# Patient Record
Sex: MALE | Race: WHITE | URBAN - METROPOLITAN AREA
[De-identification: names, ages, dates, MRNs, and addresses within clinical notes are randomized per-mention and may not be internally consistent; named-entity substitution may affect disease eponyms.]

---

## 2019-04-02 ENCOUNTER — OFFICE VISIT (OUTPATIENT)
Dept: ORTHOPEDIC SURGERY | Age: 59
End: 2019-04-02
Payer: COMMERCIAL

## 2019-04-02 VITALS
WEIGHT: 285 LBS | HEART RATE: 68 BPM | BODY MASS INDEX: 40.8 KG/M2 | RESPIRATION RATE: 16 BRPM | DIASTOLIC BLOOD PRESSURE: 87 MMHG | HEIGHT: 70 IN | SYSTOLIC BLOOD PRESSURE: 120 MMHG

## 2019-04-02 DIAGNOSIS — S93.491A HIGH ANKLE SPRAIN OF RIGHT LOWER EXTREMITY, INITIAL ENCOUNTER: ICD-10-CM

## 2019-04-02 DIAGNOSIS — M25.571 ACUTE RIGHT ANKLE PAIN: ICD-10-CM

## 2019-04-02 DIAGNOSIS — S82.391D CLOSED FRACTURE OF POSTERIOR MALLEOLUS OF RIGHT TIBIA WITH ROUTINE HEALING, SUBSEQUENT ENCOUNTER: Primary | ICD-10-CM

## 2019-04-02 PROCEDURE — G8427 DOCREV CUR MEDS BY ELIG CLIN: HCPCS | Performed by: ORTHOPAEDIC SURGERY

## 2019-04-02 PROCEDURE — 1036F TOBACCO NON-USER: CPT | Performed by: ORTHOPAEDIC SURGERY

## 2019-04-02 PROCEDURE — G8417 CALC BMI ABV UP PARAM F/U: HCPCS | Performed by: ORTHOPAEDIC SURGERY

## 2019-04-02 PROCEDURE — 3017F COLORECTAL CA SCREEN DOC REV: CPT | Performed by: ORTHOPAEDIC SURGERY

## 2019-04-02 PROCEDURE — 99203 OFFICE O/P NEW LOW 30 MIN: CPT | Performed by: ORTHOPAEDIC SURGERY

## 2019-04-02 NOTE — PROGRESS NOTES
CHIEF COMPLAINT: Right ankle pain / posterior malleolus fracture and high ankle sprain. DATE OF INJURY: 1/20/2019    HISTORY:  Mr. Lex Ramirez 62 y.o.  male presents today for second opinion evaluation of a right ankle injury which occurred when he was walking and slipped on the ice on 1/20/2019. He was first seen and evaluated in [facility] ER , where he was x-rayed, splinted and then saw orthopedist, Ismael Black and was treated in a boot weightbearing. 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 He had increased swelling and pain was intermittent. He has been wearing the boot on and off since then. He was not happy with this first orthopedic surgeon so he is here for a second opinion. He is complaining of medial and posterior ankle pain and swelling. This is better with elevation and worse with bearing any wt. The pain is achy and sore and not radiating. No numbness or tingling sensation. Alleviating factors: rest. He is unable to take NSAIDs due to allergy. No other complaint. His job requires average of 60 hours per week working and most affected is walking.     Past Medical History:   Diagnosis Date    Asthma     Diverticulitis     Hyperlipidemia     Hypertension        Past Surgical History:   Procedure Laterality Date    COLECTOMY      COLONOSCOPY      polypectomy    COLONOSCOPY  2/2/2011    normal per crna    TONSILLECTOMY         Social History     Socioeconomic History    Marital status:      Spouse name: Not on file    Number of children: Not on file    Years of education: Not on file    Highest education level: Not on file   Occupational History    Not on file   Social Needs    Financial resource strain: Not on file    Food insecurity:     Worry: Not on file     Inability: Not on file    Transportation needs:     Medical: Not on file     Non-medical: Not on file   Tobacco Use    Smoking status: Former Smoker dressed, nourished and  groomed. Patient with normal affect. Height is  5' 10\" (1.778 m), weight is 285 lb (129.3 kg), Body mass index is 40.89 kg/m². Resting respiratory rate is 16. Examination of the gait, showed that the patient walks with a mild limp, WB right leg. Examination of both ankles showing a decreased range of motion of the right ankle compare to the other side. There is mild swelling that can be seen, no ecchymosis. He has intact sensation and good pedal pulses. He has mild tenderness on deep palpation over the posterior and anterior right ankle. He has mild tenderness over the syndesmosis. IMAGING: Xrays, 3 views of the right ankle dated today in office,  were reviewed, and showed a minimally displaced posterior malleolus fracture and calcification in the syndesmosis. .      IMPRESSION: Right ankle minimally displaced posterior malleolus ankle fracture with high ankle sprain. PLAN:  I discussed that the overall alignment of this fracture is good and that we can try to treat this non-operatively WB. We discussed the risk of nonunion and or malunion. No heavy impact activities. Compression hose to help with swelling. We instructed him to discontinue the boot. He is unable to take NSAIDs due to allergy. We will see him  back in 6 weeks at which time we will get a new xray of the right ankle.        He can return to work with no more than 6 hours of walking per day      Javid Church MD

## 2019-04-05 PROBLEM — S82.391A CLOSED FRACTURE OF POSTERIOR MALLEOLUS OF RIGHT TIBIA: Status: ACTIVE | Noted: 2019-04-05

## 2023-07-26 ENCOUNTER — HOSPITAL ENCOUNTER (EMERGENCY)
Age: 63
Discharge: HOME | End: 2023-07-26
Attending: EMERGENCY MEDICINE
Payer: COMMERCIAL

## 2023-07-26 ENCOUNTER — APPOINTMENT (OUTPATIENT)
Dept: GENERAL RADIOLOGY | Age: 63
End: 2023-07-26
Payer: COMMERCIAL

## 2023-07-26 VITALS
BODY MASS INDEX: 42.89 KG/M2 | OXYGEN SATURATION: 94 % | HEART RATE: 85 BPM | TEMPERATURE: 97.8 F | DIASTOLIC BLOOD PRESSURE: 70 MMHG | HEIGHT: 70 IN | RESPIRATION RATE: 24 BRPM | WEIGHT: 299.6 LBS | SYSTOLIC BLOOD PRESSURE: 132 MMHG
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
## 2023-07-26 PROCEDURE — 99283 EMERGENCY DEPT VISIT LOW MDM: CPT

## 2023-07-26 PROCEDURE — 6370000000 HC RX 637 (ALT 250 FOR IP): Performed by: PHYSICIAN ASSISTANT

## 2023-07-26 PROCEDURE — 72100 X-RAY EXAM L-S SPINE 2/3 VWS: CPT

## 2023-07-26 PROCEDURE — 72170 X-RAY EXAM OF PELVIS: CPT

## 2023-07-26 RX ORDER — OXYCODONE HYDROCHLORIDE AND ACETAMINOPHEN 5; 325 MG/1; MG/1
1 TABLET ORAL
Status: COMPLETED | OUTPATIENT
Start: 2023-07-26 | End: 2023-07-26

## 2023-07-26 RX ADMIN — OXYCODONE HYDROCHLORIDE AND ACETAMINOPHEN 1 TABLET: 5; 325 TABLET ORAL at 14:17

## 2023-07-26 ASSESSMENT — PAIN SCALES - GENERAL
PAINLEVEL_OUTOF10: 4
PAINLEVEL_OUTOF10: 5

## 2023-07-26 ASSESSMENT — PAIN - FUNCTIONAL ASSESSMENT: PAIN_FUNCTIONAL_ASSESSMENT: 0-10

## 2023-07-26 ASSESSMENT — PAIN DESCRIPTION - DESCRIPTORS
DESCRIPTORS: ACHING
DESCRIPTORS: ACHING

## 2023-07-26 ASSESSMENT — LIFESTYLE VARIABLES: HOW OFTEN DO YOU HAVE A DRINK CONTAINING ALCOHOL: NEVER

## 2023-07-26 ASSESSMENT — ENCOUNTER SYMPTOMS: BACK PAIN: 1

## 2023-07-26 ASSESSMENT — PAIN DESCRIPTION - LOCATION
LOCATION: HIP
LOCATION: HIP

## 2023-07-26 ASSESSMENT — PAIN DESCRIPTION - ORIENTATION
ORIENTATION: LEFT
ORIENTATION: LEFT

## 2023-07-26 NOTE — ED PROVIDER NOTES
[facility]          PHYSICIAN ASSISTANT NOTE     Date of evaluation: 7/26/2023    Chief Complaint     Fall (Pt fell while working in a [employer] yard. Pt expresses no LOC, head injury, or blood thinner use. Pt c/o left hip and leg pain 4/10.)    History of Present Illness     HPI: Bere Giang is a 58 y.o. male with history of asthma, hyperlipidemia and hypertension who presents to the emergency department with fall. Patient was working in a [employer] yard when he tripped over the concrete sidewalk, landing on his left side. He denies hitting his head or losing consciousness and is not anticoagulated. He is primarily having pain across his lower back primarily on the left side. He had difficulty getting up and moving his left leg which is why 911 was called. He denies any pain into the upper extremities, chest pain or abdominal pain. He denies any neck or upper back pain. With the exception of the above, there are no aggravating or alleviating factors. Review of Systems     Review of Systems   Musculoskeletal:  Positive for back pain. Negative for joint swelling and neck pain. Neurological:  Negative for syncope and headaches. As stated above, all other systems reviewed and are otherwise negative. Past Medical, Surgical, Family, and Social History     He has a past medical history of Asthma, Diverticulitis, Hyperlipidemia, and Hypertension. He has a past surgical history that includes colectomy; Tonsillectomy; Colonoscopy; and Colonoscopy (2/2/2011). His family history includes Cancer in his mother; Heart Disease in his father; High Blood Pressure in his father. He reports that he quit smoking about 22 years ago. His smoking use included cigarettes. He has a 25.00 pack-year smoking history. He has never used smokeless tobacco. He reports current alcohol use. He reports that he does not use drugs.     Medications     Previous Medications    ALBUTEROL

## 2023-07-26 NOTE — DISCHARGE INSTRUCTIONS
ED Course     Today, you were seen in the Emergency Department. You have been diagnosed with:   1. Fall, initial encounter      Disposition/Plan     IMPORTANT INSTRUCTIONS:  You were seen in the emergency department today for evaluation after a fall    Your work up here was negative including x-rays of your back, pelvis and hip. SEEK IMMEDIATE MEDICAL CARE IF:   You develop a severe headache  You develop visual changes, double vision, blurry vision  You have numbness, weakness, tingling  You have severe nausea, vomiting  Others notice you are persistently drowsy or have an altered level of consciousness  You have another fall  You have severe symptoms that are different from your first symptoms. Or you have any other concerns    PLEASE FOLLOW UP WITH:  [facility] AT [address]  [phone]    Schedule an appointment as soon as possible for a visit   To establish care with a primary care provider      DISCHARGE MEDICATIONS:  The following medications were prescribed for the you during this visit. Take them as directed below:     New Prescriptions    No medications on file       These home medications were modified during this visit:   Modified Medications    No medications on file       Additional important information has been included with this packet, please make sure that you have read this information as it will better help you understand you illness/injury better.

## 2023-07-26 NOTE — ED PROVIDER NOTES
ED Attending Attestation Note     Date of evaluation: 7/26/2023    This patient was seen by the advance practice provider. I have seen and examined the patient, agree with the workup, evaluation, management and diagnosis. The care plan has been discussed. My assessment reveals patient comes in status post fall. Patient tripped on a curb. Landed on left side. Complains of left hip pain. On exam negative logroll. There is no shortening. He is able to ambulate. X-rays were obtained. There is no evidence of acute osseous abnormality of the pelvis but he does have osteoarthritis and does have lumbar degenerative disc disease L5-S1. No acute findings. Prasanth Garcia MD  07/26/23 [id number]